# Patient Record
Sex: MALE | NOT HISPANIC OR LATINO | URBAN - NONMETROPOLITAN AREA
[De-identification: names, ages, dates, MRNs, and addresses within clinical notes are randomized per-mention and may not be internally consistent; named-entity substitution may affect disease eponyms.]

---

## 2018-05-14 ENCOUNTER — TREATMENT (OUTPATIENT)
Dept: PHYSICAL THERAPY | Facility: CLINIC | Age: 28
End: 2018-05-14

## 2018-05-14 DIAGNOSIS — M54.5 CHRONIC BILATERAL LOW BACK PAIN, WITH SCIATICA PRESENCE UNSPECIFIED: Primary | ICD-10-CM

## 2018-05-14 DIAGNOSIS — M54.2 PAIN, NECK: ICD-10-CM

## 2018-05-14 DIAGNOSIS — G89.29 CHRONIC BILATERAL LOW BACK PAIN, WITH SCIATICA PRESENCE UNSPECIFIED: Primary | ICD-10-CM

## 2018-05-14 PROCEDURE — 97110 THERAPEUTIC EXERCISES: CPT | Performed by: PHYSICAL THERAPIST

## 2018-05-14 PROCEDURE — 97161 PT EVAL LOW COMPLEX 20 MIN: CPT | Performed by: PHYSICAL THERAPIST

## 2018-05-14 NOTE — PROGRESS NOTES
Physical Therapy Initial Evaluation and Plan of Care      Patient: Aspen Daniels   : 1990  Diagnosis/ICD-10 Code:  Chronic bilateral low back pain, with sciatica presence unspecified [M54.5, G89.29]  Referring practitioner: Xochilt Soriano MD    Subjective Evaluation    History of Present Illness  Mechanism of injury: Patient states his left side of back started hurting following a fall onto his back about 6 months ago and it was feeling better for about 2 months then he started working out at the gym again and aggravated it. States pain is constant. States he can't sleep on his back due to pain.     States he has had left neck pain for about 3-4 months. States he believes it's because of how he has been locking at his phone before going to bed. States pain is not constant and only happens when he turns a certain way.        Patient Occupation: Student (fire protection) Pain  No pain reported  Pain scale: 2-3 neck soreness, 1/10 back pain.  At best pain ratin  Pain scale at highest: 6/10 neck pain, 4/10 back pain.  Location: Left neck pain from mid neck to left upper shoulder, left back pain  Quality: tight (Sore)  Relieving factors: heat  Aggravating factors: lifting, ambulation and prolonged positioning (Walking on upward slope )    Hand dominance: right    Diagnostic Tests  X-ray: normal    Patient Goals  Patient goals for therapy: increased motion, improved balance, return to sport/leisure activities, independence with ADLs/IADLs, increased strength and decreased pain  Patient goal: Return to working out (mostly machines and squats)           Objective       Postural Observations  Seated posture: good  Standing posture: good        Palpation   Left   Hypertonic in the erector spinae, cervical paraspinals, levator scapulae, lumbar paraspinals, middle trapezius, rhomboids and upper trapezius.   Tenderness of the erector spinae, cervical paraspinals, levator scapulae, lumbar paraspinals, middle  trapezius, rhomboids and upper trapezius.     Tenderness   Cervical Spine   Tenderness in the spinous process and left scapula.     Lumbar Spine  Tenderness in the spinous process.     Neurological Testing     Sensation     Lumbar   Left   Intact: light touch    Right   Intact: light touch    Active Range of Motion   Cervical/Thoracic Spine   Cervical    Left lateral flexion: 35 degrees   Right lateral flexion: 36 degrees   Left rotation: 60 degrees   Right rotation: 61 degrees     Lumbar   Flexion: WFL  Extension: WFL    Additional Active Range of Motion Details  Lumbar pain coming up from flexed position    Strength/Myotome Testing     Lumbar     Right   Normal strength    Left Hip   Planes of Motion   Flexion: 4-  Extension: 4-  Abduction: 4-  Adduction: 4-    Muscle Activation   Patient able to activate left transverse abdominals.          Assessment & Plan     Assessment  Impairments: abnormal muscle firing, abnormal muscle tone, abnormal or restricted ROM, activity intolerance, impaired physical strength and lacks appropriate home exercise program  Assessment details: Patient is a 27 year old male presenting with left sided lumbar and upper thoracic and lower cervical pain following a fall onto his back approximately 6 months ago. Signs and symptoms are consistent with muscle stain and lumbar instability. Patient presents with decreased left hip strength, decreased lumbar stabilization, decreased scapular activation, decreased cervical and thoracic mobility, and decreased ability to perform all tasks. Patient is appropriate for physical therapy at this time to address the above issues.  Prognosis: good  Prognosis details: Short term goals (3 weeks):  1. Patient will be independent with home exercise program.  2. Patient will demonstrate improved hip strength by 50%  3. Patient will demonstrate improved scapular activation by 50%    Long term goals (6 weeks):  1. Patient will be able to walk at least 30 minutes  without increase in back pain.  2. Patient will demonstrate full squat with no increase in back pain.  3. Patient will be able to work overhead for at least 15 minutes without increase in cervical or thoracic pain.   Functional Limitations: carrying objects, lifting, walking, pulling, pushing and uncomfortable because of pain  Plan  Therapy options: will be seen for skilled physical therapy services  Planned modality interventions: cryotherapy, high voltage pulsed current (pain management), high voltage pulsed current (spasm management), TENS, thermotherapy (hydrocollator packs), traction and ultrasound  Planned therapy interventions: abdominal trunk stabilization, ADL retraining, balance/weight-bearing training, body mechanics training, fine motor coordination training, flexibility, functional ROM exercises, home exercise program, gait training, IADL retraining, joint mobilization, manual therapy, motor coordination training, neuromuscular re-education, postural training, soft tissue mobilization, spinal/joint mobilization, strengthening, stretching and therapeutic activities  Frequency: 1-3x/week.  Duration in weeks: 6  Treatment plan discussed with: patient        Manual Therapy:         mins  81210;  Therapeutic Exercise:    24     mins  70060;     Neuromuscular Elyssa:        mins  30296;    Therapeutic Activity:          mins  76603;     Gait Training:           mins  27158;     Ultrasound:          mins  31625;    Electrical Stimulation:        mins  93113 ( );  Dry Needling          mins self-pay    Timed Treatment:   24   mins   Total Treatment:     58   mins    PT SIGNATURE: Rosetta Pereira, FRANDY   DATE TREATMENT INITIATED: 5/15/2018    Initial Certification  Certification Period: 8/13/2018  I certify that the therapy services are furnished while this patient is under my care.  The services outlined above are required by this patient, and will be reviewed every 90 days.     PHYSICIAN: Xochilt Soriano,  MD      DATE:     Please sign and return via fax to [phone].. Thank you, [facility].

## 2018-05-18 ENCOUNTER — TREATMENT (OUTPATIENT)
Dept: PHYSICAL THERAPY | Facility: CLINIC | Age: 28
End: 2018-05-18

## 2018-05-18 PROCEDURE — 97112 NEUROMUSCULAR REEDUCATION: CPT | Performed by: PHYSICAL THERAPIST

## 2018-05-18 PROCEDURE — 97110 THERAPEUTIC EXERCISES: CPT | Performed by: PHYSICAL THERAPIST

## 2018-05-18 NOTE — PROGRESS NOTES
Subjective   Aspen Jeremiah reports: 4/10 lumbar pain, 0/10 pain in upper thoracic.     Objective   Motor control: difficulty maintaining TA hold with feet marching as well as with birddog exercise.       See Exercise, Manual, and Modality Logs for complete treatment.       Assessment/Plan  Pt progressing with exercises without increase in upper or lower back pain. Will continue to work on lumbar and thoracic stabilization.   Progress per Plan of Care           Manual Therapy:    10     mins  09709;  Therapeutic Exercise:    23     mins  44159;     Neuromuscular Elyssa:   12     mins  43307;    Therapeutic Activity:          mins  72730;     Gait Training:           mins  59724;     Ultrasound:          mins  75413;   Iontophoresis          mins  09796   Electrical Stimulation:         mins  47360 ( );  Dry Needling          mins self-pay  Fluidotherapy          mins 71606    Timed Treatment:   45   mins   Total Treatment:     47   mins    Rosetta Pereira, PT  Physical Therapist

## 2018-05-25 ENCOUNTER — TREATMENT (OUTPATIENT)
Dept: PHYSICAL THERAPY | Facility: CLINIC | Age: 28
End: 2018-05-25

## 2018-05-25 DIAGNOSIS — M54.5 CHRONIC BILATERAL LOW BACK PAIN, WITH SCIATICA PRESENCE UNSPECIFIED: Primary | ICD-10-CM

## 2018-05-25 DIAGNOSIS — M54.2 PAIN, NECK: ICD-10-CM

## 2018-05-25 DIAGNOSIS — G89.29 CHRONIC BILATERAL LOW BACK PAIN, WITH SCIATICA PRESENCE UNSPECIFIED: Primary | ICD-10-CM

## 2018-05-25 PROCEDURE — 97140 MANUAL THERAPY 1/> REGIONS: CPT | Performed by: PHYSICAL THERAPIST

## 2018-05-25 PROCEDURE — 97112 NEUROMUSCULAR REEDUCATION: CPT | Performed by: PHYSICAL THERAPIST

## 2018-05-25 PROCEDURE — 97110 THERAPEUTIC EXERCISES: CPT | Performed by: PHYSICAL THERAPIST

## 2018-05-25 NOTE — PROGRESS NOTES
Subjective   Aspen Daniels reports: 5/10 low back and 3-4 right post shoulder/upper back pain. States upper back is hurting some with certain exercises in the gym. States overall his pain is getting better compared to the first visit.  States he has been having some right patella and hamstring pain when he sits cross-legged (which he often does at home to eat - cultural)     Objective   Palpation: mild tenderness on L5-S1 segment and right PSIS    Negative for medial or lateral pain or laxity with provocation tests. Hip IR and ER full and painfree. Possible patellar tracking issue.     See Exercise, Manual, and Modality Logs for complete treatment.       Assessment/Plan  Pt reports improvement in pain (despite higher nominal rating).   Progress per Plan of Care           Manual Therapy:    15     mins  39178;  Therapeutic Exercise:    31     mins  53222;     Neuromuscular Elyssa:    8    mins  45969;    Therapeutic Activity:          mins  57197;     Gait Training:           mins  71790;     Ultrasound:          mins  26653;   Iontophoresis          mins  12685   Electrical Stimulation:         mins  44427 ( );  Dry Needling          mins self-pay  Fluidotherapy          mins 32588    Timed Treatment:   54   mins   Total Treatment:     58   mins    Rosetta Pereira, PT  Physical Therapist

## 2018-06-06 ENCOUNTER — TREATMENT (OUTPATIENT)
Dept: PHYSICAL THERAPY | Facility: CLINIC | Age: 28
End: 2018-06-06

## 2018-06-06 DIAGNOSIS — G89.29 CHRONIC BILATERAL LOW BACK PAIN, WITH SCIATICA PRESENCE UNSPECIFIED: Primary | ICD-10-CM

## 2018-06-06 DIAGNOSIS — M54.2 PAIN, NECK: ICD-10-CM

## 2018-06-06 DIAGNOSIS — M54.5 CHRONIC BILATERAL LOW BACK PAIN, WITH SCIATICA PRESENCE UNSPECIFIED: Primary | ICD-10-CM

## 2018-06-06 PROCEDURE — 97140 MANUAL THERAPY 1/> REGIONS: CPT | Performed by: PHYSICAL THERAPIST

## 2018-06-06 PROCEDURE — 97110 THERAPEUTIC EXERCISES: CPT | Performed by: PHYSICAL THERAPIST

## 2018-06-06 PROCEDURE — 97112 NEUROMUSCULAR REEDUCATION: CPT | Performed by: PHYSICAL THERAPIST

## 2018-06-06 NOTE — PROGRESS NOTES
Subjective   Aspen Dangeloshimi reports: No back or neck pain at rest. Mild lower lumbar pain with sleeping on stomach, upper back/ neck is better.     Objective   Palpation: no tenderness in lumbar segments   See Exercise, Manual, and Modality Logs for complete treatment.       Assessment/Plan  Pt improving with symptoms. Some pain with SLR which was reduced with addition of ppt suggesting lingering decreased stability.   Progress per Plan of Care           Manual Therapy:    15     mins  49762;  Therapeutic Exercise:    32     mins  22114;     Neuromuscular Elyssa:    8    mins  96082;    Therapeutic Activity:          mins  61286;     Gait Training:           mins  76387;     Ultrasound:          mins  23517;   Iontophoresis          mins  55971   Electrical Stimulation:         mins  30130 (MC );  Dry Needling          mins self-pay  Fluidotherapy          mins 99788    Timed Treatment:   54   mins   Total Treatment:     58   mins    Rosetta Pereira, PT  Physical Therapist

## 2018-06-13 ENCOUNTER — TREATMENT (OUTPATIENT)
Dept: PHYSICAL THERAPY | Facility: CLINIC | Age: 28
End: 2018-06-13

## 2018-06-13 DIAGNOSIS — M54.5 CHRONIC BILATERAL LOW BACK PAIN, WITH SCIATICA PRESENCE UNSPECIFIED: Primary | ICD-10-CM

## 2018-06-13 DIAGNOSIS — M54.2 PAIN, NECK: ICD-10-CM

## 2018-06-13 DIAGNOSIS — G89.29 CHRONIC BILATERAL LOW BACK PAIN, WITH SCIATICA PRESENCE UNSPECIFIED: Primary | ICD-10-CM

## 2018-06-13 PROCEDURE — 97110 THERAPEUTIC EXERCISES: CPT | Performed by: PHYSICAL THERAPIST

## 2018-06-13 PROCEDURE — 97140 MANUAL THERAPY 1/> REGIONS: CPT | Performed by: PHYSICAL THERAPIST

## 2018-06-13 NOTE — PROGRESS NOTES
Subjective   Aspen Daniels reports: 6/10 pain at rest. States it has been better than before but still having pain in lower back and right shoulder with exercise.     Objective   Palpation: Tenderness in right lev scap, mild tenderness along left iliac crest    See Exercise, Manual, and Modality Logs for complete treatment.       Assessment/Plan   Neck/shoulder relief following exercise and with thoracic manipulation. Pt requested manipulation, which is appropr stating where he is from that would solve his issue instantly, discussed getting to root of problem rather than temporary symptom relief. Patient left abruptly following manipulation.   Progress per Plan of Care           Manual Therapy:    15     mins  44883;  Therapeutic Exercise:    32     mins  16380;     Neuromuscular Elyssa:        mins  33697;    Therapeutic Activity:          mins  34759;     Gait Training:           mins  23047;     Ultrasound:          mins  90580;   Iontophoresis         mins  71807   Electrical Stimulation:         mins  07658 ( );  Dry Needling          mins self-pay  Fluidotherapy          mins 45097    Timed Treatment:   47   mins   Total Treatment:     51   mins    Rosetta Pereira, PT  Physical Therapist

## 2018-06-20 ENCOUNTER — TREATMENT (OUTPATIENT)
Dept: PHYSICAL THERAPY | Facility: CLINIC | Age: 28
End: 2018-06-20

## 2018-06-20 DIAGNOSIS — M54.2 PAIN, NECK: ICD-10-CM

## 2018-06-20 DIAGNOSIS — G89.29 CHRONIC BILATERAL LOW BACK PAIN, WITH SCIATICA PRESENCE UNSPECIFIED: Primary | ICD-10-CM

## 2018-06-20 DIAGNOSIS — M54.5 CHRONIC BILATERAL LOW BACK PAIN, WITH SCIATICA PRESENCE UNSPECIFIED: Primary | ICD-10-CM

## 2018-06-20 PROCEDURE — 97110 THERAPEUTIC EXERCISES: CPT | Performed by: PHYSICAL THERAPIST

## 2018-06-20 PROCEDURE — 97140 MANUAL THERAPY 1/> REGIONS: CPT | Performed by: PHYSICAL THERAPIST

## 2018-06-20 NOTE — PROGRESS NOTES
Subjective   Aspen Daniels reports: 5/10 bilateral lower back pain. States he has had lower back pain for the past few days and he has not been doing exercises because he has been busy.     Objective   Palpation: no muscular tenderness to palpation    Joint play: no tenderness to lower lumbar PA, some tenderness with CT junction PA; both improved in pain symptoms following manual therapy.   See Exercise, Manual, and Modality Logs for complete treatment.       Assessment/Plan  Back pain reduced to 3/10 following lumbar PA. Pt educated on foam rolling lower and upper back before and after working out or when he is having lumbar pain.   Progress per Plan of Care           Manual Therapy:    23     mins  97386;  Therapeutic Exercise:    33     mins  69167;     Neuromuscular Elyssa:        mins  84054;    Therapeutic Activity:          mins  17565;     Gait Training:           mins  39002;     Ultrasound:          mins  56172;   Iontophoresis          mins  51709   Electrical Stimulation:        mins  73074 ( );  Dry Needling          mins self-pay  Fluidotherapy          mins 21476    Timed Treatment:   56   mins   Total Treatment:     58   mins    Rosetta Pereira, PT  Physical Therapist

## 2018-08-21 ENCOUNTER — TELEPHONE (OUTPATIENT)
Dept: URGENT CARE | Facility: CLINIC | Age: 28
End: 2018-08-21

## 2018-09-19 ENCOUNTER — OFFICE VISIT (OUTPATIENT)
Dept: INTERNAL MEDICINE | Facility: CLINIC | Age: 28
End: 2018-09-19

## 2018-09-19 VITALS
WEIGHT: 164 LBS | OXYGEN SATURATION: 100 % | SYSTOLIC BLOOD PRESSURE: 142 MMHG | HEIGHT: 67 IN | BODY MASS INDEX: 25.74 KG/M2 | DIASTOLIC BLOOD PRESSURE: 86 MMHG | TEMPERATURE: 98.1 F | HEART RATE: 92 BPM

## 2018-09-19 DIAGNOSIS — G89.29 CHRONIC BILATERAL LOW BACK PAIN WITHOUT SCIATICA: ICD-10-CM

## 2018-09-19 DIAGNOSIS — R53.83 FATIGUE, UNSPECIFIED TYPE: ICD-10-CM

## 2018-09-19 DIAGNOSIS — Z72.0 TOBACCO ABUSE: ICD-10-CM

## 2018-09-19 DIAGNOSIS — M54.50 CHRONIC BILATERAL LOW BACK PAIN WITHOUT SCIATICA: ICD-10-CM

## 2018-09-19 DIAGNOSIS — Z00.00 ENCOUNTER FOR PREVENTIVE HEALTH EXAMINATION: Primary | ICD-10-CM

## 2018-09-19 PROCEDURE — 99395 PREV VISIT EST AGE 18-39: CPT | Performed by: INTERNAL MEDICINE

## 2018-09-19 RX ORDER — CYCLOBENZAPRINE HCL 5 MG
5 TABLET ORAL 2 TIMES DAILY PRN
Qty: 40 TABLET | Refills: 2 | Status: SHIPPED | OUTPATIENT
Start: 2018-09-19 | End: 2018-10-30

## 2018-09-19 NOTE — PROGRESS NOTES
Chief Complaint   Patient presents with   • Establish Care   • Back Pain     s/p fall while playing soccer several months ago       Subjective     History of Present Illness   Aspen Daniels is a 28 y.o. male presenting for annual physical.  Preventive health maintenance was reviewed and discussed today.  Patient shares that he has been relatively healthy other than over the last 6 months since he has been dealing with chronic back pain following injury while playing soccer.  Patient states that he slipped backwards and landed on his low back while he was going for her care.  Since that time, he has been having difficulty sitting in a chair for long periods of time, has pain with walking long distances such as across campus, and has to sleep on his side in order to get relief from pain at night.  Symptoms have been ongoing over the last several months and have not been improving with physical therapy.    The following portions of the patient's history were reviewed and updated as appropriate: allergies, current medications, past family history, past medical history, past social history, past surgical history and problem list.    Review of Systems   Constitutional: Negative for chills, fatigue and fever.   HENT: Negative for congestion, ear pain, rhinorrhea, sinus pressure and sore throat.    Eyes: Negative for visual disturbance.   Respiratory: Negative for cough, chest tightness, shortness of breath and wheezing.    Cardiovascular: Negative for chest pain, palpitations and leg swelling.   Gastrointestinal: Negative for abdominal pain, blood in stool, constipation, diarrhea, nausea and vomiting.   Endocrine: Negative for polydipsia and polyuria.   Genitourinary: Negative for dysuria and hematuria.   Musculoskeletal: Positive for arthralgias and back pain.   Skin: Negative for rash.   Neurological: Negative for dizziness, light-headedness, numbness and headaches.   Psychiatric/Behavioral: Negative for dysphoric mood and  "sleep disturbance. The patient is not nervous/anxious.        No Known Allergies    No past medical history on file.    Social History     Social History   • Marital status: Single     Spouse name: N/A   • Number of children: N/A   • Years of education: N/A     Occupational History   • Not on file.     Social History Main Topics   • Smoking status: Current Some Day Smoker   • Smokeless tobacco: Current User   • Alcohol use Yes      Comment: socially   • Drug use: No   • Sexual activity: Not on file     Other Topics Concern   • Not on file     Social History Narrative   • No narrative on file        No past surgical history on file.    Family History   Problem Relation Age of Onset   • No Known Problems Mother    • No Known Problems Father          Current Outpatient Prescriptions:   •  promethazine-dextromethorphan (PROMETHAZINE-DM) 6.25-15 MG/5ML syrup, Take 5 mL by mouth 4 (Four) Times a Day As Needed for Cough., Disp: 240 mL, Rfl: 0  •  cyclobenzaprine (FLEXERIL) 5 MG tablet, Take 1 tablet by mouth 2 (Two) Times a Day As Needed for Muscle Spasms., Disp: 40 tablet, Rfl: 2    Objective   /86 (BP Location: Left arm)   Pulse 92   Temp 98.1 °F (36.7 °C)   Ht 170.2 cm (67\")   Wt 74.4 kg (164 lb)   SpO2 100%   BMI 25.69 kg/m²     Physical Exam   Constitutional: He is oriented to person, place, and time. He appears well-nourished. No distress.   HENT:   Head: Atraumatic.   Right Ear: External ear normal.   Left Ear: External ear normal.   Eyes: Conjunctivae are normal. Right eye exhibits no discharge. Left eye exhibits no discharge.   Cardiovascular: Normal rate and regular rhythm.    No murmur heard.  Pulmonary/Chest: Effort normal and breath sounds normal. He has no wheezes. He has no rales.   Abdominal: Soft. Bowel sounds are normal. He exhibits no distension. There is no tenderness.   Musculoskeletal:   Low back tenderness along SI joint   Neurological: He is alert and oriented to person, place, and time. "   Skin: No rash noted. He is not diaphoretic.   Psychiatric: He has a normal mood and affect.   Nursing note and vitals reviewed.      Assessment/Plan   Aspen was seen today for establish care and back pain.    Diagnoses and all orders for this visit:    Encounter for preventive health examination  -     CBC & Differential  -     Comprehensive Metabolic Panel  -     TSH  -     Vitamin B12  -     Vitamin D 25 Hydroxy  -     HIV-1 / O / 2 Ag / Antibody 4th Generation    Chronic bilateral low back pain without sciatica  -     MRI Lumbar Spine Without Contrast; Future  -     cyclobenzaprine (FLEXERIL) 5 MG tablet; Take 1 tablet by mouth 2 (Two) Times a Day As Needed for Muscle Spasms.    Tobacco abuse  -     CBC & Differential  -     Comprehensive Metabolic Panel  -     TSH  -     Vitamin B12  -     Vitamin D 25 Hydroxy  -     HIV-1 / O / 2 Ag / Antibody 4th Generation    Fatigue, unspecified type  -     CBC & Differential  -     Comprehensive Metabolic Panel  -     TSH  -     Vitamin B12  -     Vitamin D 25 Hydroxy  -     HIV-1 / O / 2 Ag / Antibody 4th Generation          Discussion Summary:    1. Preventive Health Maintenance  - Baseline labs ordered per above.  - Vaccines reviewed and updated  - Preventive health measures were discussed including: healthy diet with increase in fruits and vegetables, regular exercise at least 3 times a week, safe sex practices, avoidance of drugs, tobacco, and alcohol, and regular seatbelt use.    2.  Chronic low back pain  -Patient has attempted physical therapy without benefit over the last 6 months.  Given the persistent symptoms and inability to ambulate long distances.  Patient would benefit from further evaluation with MRI.  Order has been placed.  -Compounding pain cream prescribed in addition to Flexeril to be used at nighttime.    3.  Tobacco abuse  -Patient encouraged to quit however he is not ready to reduce his cigarette use at this time.      Follow up:  Return in about 6  weeks (around 10/31/2018) for Next scheduled follow up.     Patient Instructions:  Patient instructions were provided.

## 2018-09-19 NOTE — PATIENT INSTRUCTIONS
Health Maintenance, Male  A healthy lifestyle and preventive care is important for your health and wellness. Ask your health care provider about what schedule of regular examinations is right for you.  What should I know about weight and diet?  Eat a Healthy Diet  · Eat plenty of vegetables, fruits, whole grains, low-fat dairy products, and lean protein.  · Do not eat a lot of foods high in solid fats, added sugars, or salt.    Maintain a Healthy Weight  Regular exercise can help you achieve or maintain a healthy weight. You should:  · Do at least 150 minutes of exercise each week. The exercise should increase your heart rate and make you sweat (moderate-intensity exercise).  · Do strength-training exercises at least twice a week.    Watch Your Levels of Cholesterol and Blood Lipids  · Have your blood tested for lipids and cholesterol every 5 years starting at 35 years of age. If you are at high risk for heart disease, you should start having your blood tested when you are 20 years old. You may need to have your cholesterol levels checked more often if:  ? Your lipid or cholesterol levels are high.  ? You are older than 50 years of age.  ? You are at high risk for heart disease.    What should I know about cancer screening?  Many types of cancers can be detected early and may often be prevented.  Lung Cancer  · You should be screened every year for lung cancer if:  ? You are a current smoker who has smoked for at least 30 years.  ? You are a former smoker who has quit within the past 15 years.  · Talk to your health care provider about your screening options, when you should start screening, and how often you should be screened.    Colorectal Cancer  · Routine colorectal cancer screening usually begins at 50 years of age and should be repeated every 5-10 years until you are 75 years old. You may need to be screened more often if early forms of precancerous polyps or small growths are found. Your health care provider  may recommend screening at an earlier age if you have risk factors for colon cancer.  · Your health care provider may recommend using home test kits to check for hidden blood in the stool.  · A small camera at the end of a tube can be used to examine your colon (sigmoidoscopy or colonoscopy). This checks for the earliest forms of colorectal cancer.    Prostate and Testicular Cancer  · Depending on your age and overall health, your health care provider may do certain tests to screen for prostate and testicular cancer.  · Talk to your health care provider about any symptoms or concerns you have about testicular or prostate cancer.    Skin Cancer  · Check your skin from head to toe regularly.  · Tell your health care provider about any new moles or changes in moles, especially if:  ? There is a change in a mole’s size, shape, or color.  ? You have a mole that is larger than a pencil eraser.  · Always use sunscreen. Apply sunscreen liberally and repeat throughout the day.  · Protect yourself by wearing long sleeves, pants, a wide-brimmed hat, and sunglasses when outside.    What should I know about heart disease, diabetes, and high blood pressure?  · If you are 18-39 years of age, have your blood pressure checked every 3-5 years. If you are 40 years of age or older, have your blood pressure checked every year. You should have your blood pressure measured twice--once when you are at a hospital or clinic, and once when you are not at a hospital or clinic. Record the average of the two measurements. To check your blood pressure when you are not at a hospital or clinic, you can use:  ? An automated blood pressure machine at a pharmacy.  ? A home blood pressure monitor.  · Talk to your health care provider about your target blood pressure.  · If you are between 45-79 years old, ask your health care provider if you should take aspirin to prevent heart disease.  · Have regular diabetes screenings by checking your fasting blood  sugar level.  ? If you are at a normal weight and have a low risk for diabetes, have this test once every three years after the age of 45.  ? If you are overweight and have a high risk for diabetes, consider being tested at a younger age or more often.  · A one-time screening for abdominal aortic aneurysm (AAA) by ultrasound is recommended for men aged 65-75 years who are current or former smokers.  What should I know about preventing infection?  Hepatitis B  If you have a higher risk for hepatitis B, you should be screened for this virus. Talk with your health care provider to find out if you are at risk for hepatitis B infection.  Hepatitis C  Blood testing is recommended for:  · Everyone born from 1945 through 1965.  · Anyone with known risk factors for hepatitis C.    Sexually Transmitted Diseases (STDs)  · You should be screened each year for STDs including gonorrhea and chlamydia if:  ? You are sexually active and are younger than 24 years of age.  ? You are older than 24 years of age and your health care provider tells you that you are at risk for this type of infection.  ? Your sexual activity has changed since you were last screened and you are at an increased risk for chlamydia or gonorrhea. Ask your health care provider if you are at risk.  · Talk with your health care provider about whether you are at high risk of being infected with HIV. Your health care provider may recommend a prescription medicine to help prevent HIV infection.    What else can I do?  · Schedule regular health, dental, and eye exams.  · Stay current with your vaccines (immunizations).  · Do not use any tobacco products, such as cigarettes, chewing tobacco, and e-cigarettes. If you need help quitting, ask your health care provider.  · Limit alcohol intake to no more than 2 drinks per day. One drink equals 12 ounces of beer, 5 ounces of wine, or 1½ ounces of hard liquor.  · Do not use street drugs.  · Do not share needles.  · Ask your  health care provider for help if you need support or information about quitting drugs.  · Tell your health care provider if you often feel depressed.  · Tell your health care provider if you have ever been abused or do not feel safe at home.  This information is not intended to replace advice given to you by your health care provider. Make sure you discuss any questions you have with your health care provider.  Document Released: 06/15/2009 Document Revised: 08/16/2017 Document Reviewed: 09/20/2016  [employer] Interactive Patient Education © 2018 [employer]

## 2018-09-21 LAB
25(OH)D3+25(OH)D2 SERPL-MCNC: 23 NG/ML
ALBUMIN SERPL-MCNC: 4.4 G/DL (ref 3.5–5)
ALBUMIN/GLOB SERPL: 1.6 G/DL (ref 1–2)
ALP SERPL-CCNC: 77 U/L (ref 38–126)
ALT SERPL-CCNC: 38 U/L (ref 13–69)
AST SERPL-CCNC: 28 U/L (ref 15–46)
BASOPHILS # BLD AUTO: 0.05 10*3/MM3 (ref 0–0.2)
BASOPHILS NFR BLD AUTO: 0.8 % (ref 0–2.5)
BILIRUB SERPL-MCNC: 1 MG/DL (ref 0.2–1.3)
BUN SERPL-MCNC: 12 MG/DL (ref 7–20)
BUN/CREAT SERPL: 13.3 (ref 6.3–21.9)
CALCIUM SERPL-MCNC: 9.5 MG/DL (ref 8.4–10.2)
CHLORIDE SERPL-SCNC: 103 MMOL/L (ref 98–107)
CO2 SERPL-SCNC: 26 MMOL/L (ref 26–30)
CREAT SERPL-MCNC: 0.9 MG/DL (ref 0.6–1.3)
EOSINOPHIL # BLD AUTO: 0.15 10*3/MM3 (ref 0–0.7)
EOSINOPHIL NFR BLD AUTO: 2.5 % (ref 0–7)
ERYTHROCYTE [DISTWIDTH] IN BLOOD BY AUTOMATED COUNT: 12.8 % (ref 11.5–14.5)
GLOBULIN SER CALC-MCNC: 2.8 GM/DL
GLUCOSE SERPL-MCNC: 96 MG/DL (ref 74–98)
HCT VFR BLD AUTO: 49.6 % (ref 42–52)
HGB BLD-MCNC: 16.9 G/DL (ref 14–18)
HIV 1+2 AB+HIV1 P24 AG SERPL QL IA: NON REACTIVE
IMM GRANULOCYTES # BLD: 0.06 10*3/MM3 (ref 0–0.06)
IMM GRANULOCYTES NFR BLD: 1 % (ref 0–0.6)
LYMPHOCYTES # BLD AUTO: 2.94 10*3/MM3 (ref 0.6–3.4)
LYMPHOCYTES NFR BLD AUTO: 49.9 % (ref 10–50)
MCH RBC QN AUTO: 30.3 PG (ref 27–31)
MCHC RBC AUTO-ENTMCNC: 34.1 G/DL (ref 30–37)
MCV RBC AUTO: 89 FL (ref 80–94)
MONOCYTES # BLD AUTO: 0.53 10*3/MM3 (ref 0–0.9)
MONOCYTES NFR BLD AUTO: 9 % (ref 0–12)
NEUTROPHILS # BLD AUTO: 2.16 10*3/MM3 (ref 2–6.9)
NEUTROPHILS NFR BLD AUTO: 36.8 % (ref 37–80)
NRBC BLD AUTO-RTO: 0 /100 WBC (ref 0–0)
PLATELET # BLD AUTO: 219 10*3/MM3 (ref 130–400)
POTASSIUM SERPL-SCNC: 4.5 MMOL/L (ref 3.5–5.1)
PROT SERPL-MCNC: 7.2 G/DL (ref 6.3–8.2)
RBC # BLD AUTO: 5.57 10*6/MM3 (ref 4.7–6.1)
SODIUM SERPL-SCNC: 137 MMOL/L (ref 137–145)
TSH SERPL DL<=0.005 MIU/L-ACNC: 1.24 MIU/ML (ref 0.47–4.68)
VIT B12 SERPL-MCNC: 672 PG/ML (ref 239–931)
WBC # BLD AUTO: 5.89 10*3/MM3 (ref 4.8–10.8)

## 2018-09-21 NOTE — PROGRESS NOTES
But the patient know that his labs were all in normal range.  His vitamin D is borderline low.  I will recommended taking 2000 units daily of vitamin D available over-the-counter.

## 2018-09-26 ENCOUNTER — HOSPITAL ENCOUNTER (OUTPATIENT)
Dept: MRI IMAGING | Facility: HOSPITAL | Age: 28
Discharge: HOME | End: 2018-09-26
Attending: INTERNAL MEDICINE | Admitting: INTERNAL MEDICINE

## 2018-09-26 DIAGNOSIS — M54.50 CHRONIC BILATERAL LOW BACK PAIN WITHOUT SCIATICA: ICD-10-CM

## 2018-09-26 DIAGNOSIS — G89.29 CHRONIC BILATERAL LOW BACK PAIN WITHOUT SCIATICA: ICD-10-CM

## 2018-09-26 PROCEDURE — 72148 MRI LUMBAR SPINE W/O DYE: CPT

## 2018-09-27 NOTE — PROGRESS NOTES
MRI spine is normal.  He has no evidence of disc disease or fractures.  Soft tissues are also appearing normal.

## 2018-12-04 ENCOUNTER — OFFICE VISIT (OUTPATIENT)
Dept: INTERNAL MEDICINE | Facility: CLINIC | Age: 28
End: 2018-12-04

## 2018-12-04 VITALS
OXYGEN SATURATION: 100 % | HEART RATE: 76 BPM | SYSTOLIC BLOOD PRESSURE: 142 MMHG | BODY MASS INDEX: 26.53 KG/M2 | HEIGHT: 67 IN | TEMPERATURE: 98.2 F | WEIGHT: 169 LBS | DIASTOLIC BLOOD PRESSURE: 84 MMHG

## 2018-12-04 DIAGNOSIS — M54.50 CHRONIC MIDLINE LOW BACK PAIN WITHOUT SCIATICA: Primary | ICD-10-CM

## 2018-12-04 DIAGNOSIS — R79.89 LOW VITAMIN D LEVEL: ICD-10-CM

## 2018-12-04 DIAGNOSIS — G89.29 CHRONIC MIDLINE LOW BACK PAIN WITHOUT SCIATICA: Primary | ICD-10-CM

## 2018-12-04 DIAGNOSIS — M54.89 INFLAMMATORY BACK PAIN: ICD-10-CM

## 2018-12-04 PROCEDURE — 99214 OFFICE O/P EST MOD 30 MIN: CPT | Performed by: INTERNAL MEDICINE

## 2018-12-04 RX ORDER — CHOLECALCIFEROL (VITAMIN D3) 50 MCG
2000 TABLET ORAL DAILY
COMMUNITY

## 2018-12-04 NOTE — PROGRESS NOTES
Chief Complaint   Patient presents with   • Back Pain     x 1 year       Subjective     History of Present Illness   Aspen Daniels is a 28 y.o. male presenting with persistent back pain over the last 1 year.  MRI results were reviewed and discussed.  Patient had no evidence of disc disease or nerve impingement. He has had limited benefit from physical therapy and compounding pain creams.  He is interested in specialist work up. Pain remains in the lower lumbar spine midline without radiating symptoms.     The following portions of the patient's history were reviewed and updated as appropriate: allergies, current medications, past family history, past medical history, past social history, past surgical history and problem list.    Review of Systems   Constitutional: Negative for chills, fatigue and fever.   HENT: Negative for congestion, ear pain, rhinorrhea, sinus pressure and sore throat.    Eyes: Negative for visual disturbance.   Respiratory: Negative for cough, chest tightness, shortness of breath and wheezing.    Cardiovascular: Negative for chest pain, palpitations and leg swelling.   Gastrointestinal: Negative for abdominal pain, blood in stool, constipation, diarrhea, nausea and vomiting.   Endocrine: Negative for polydipsia and polyuria.   Genitourinary: Negative for dysuria and hematuria.   Musculoskeletal: Positive for back pain.   Skin: Negative for rash.   Neurological: Negative for dizziness, light-headedness, numbness and headaches.   Psychiatric/Behavioral: Negative for dysphoric mood and sleep disturbance. The patient is not nervous/anxious.        No Known Allergies    No past medical history on file.    Social History     Socioeconomic History   • Marital status: Single     Spouse name: Not on file   • Number of children: Not on file   • Years of education: Not on file   • Highest education level: Not on file   Social Needs   • Financial resource strain: Not on file   • Food insecurity - worry: Not  "on file   • Food insecurity - inability: Not on file   • Transportation needs - medical: Not on file   • Transportation needs - non-medical: Not on file   Occupational History   • Not on file   Tobacco Use   • Smoking status: Current Some Day Smoker   • Smokeless tobacco: Current User   Substance and Sexual Activity   • Alcohol use: Yes     Comment: socially   • Drug use: No   • Sexual activity: Not on file   Other Topics Concern   • Not on file   Social History Narrative   • Not on file        No past surgical history on file.    Family History   Problem Relation Age of Onset   • No Known Problems Mother    • No Known Problems Father          Current Outpatient Medications:   •  Cholecalciferol (VITAMIN D) 2000 units tablet, Take 2,000 Units by mouth Daily., Disp: , Rfl:     Objective   /84 (BP Location: Left arm, Patient Position: Sitting)   Pulse 76   Temp 98.2 °F (36.8 °C)   Ht 170.2 cm (67\")   Wt 76.7 kg (169 lb)   SpO2 100%   BMI 26.47 kg/m²     Physical Exam   Constitutional: He is oriented to person, place, and time. He appears well-developed and well-nourished.   HENT:   Head: Normocephalic and atraumatic.   Eyes: Conjunctivae are normal.   Pulmonary/Chest: Effort normal.   Musculoskeletal: Normal range of motion.   Neurological: He is alert and oriented to person, place, and time.   Psychiatric: He has a normal mood and affect. His behavior is normal.   Nursing note and vitals reviewed.      Assessment/Plan   Aspen was seen today for back pain.    Diagnoses and all orders for this visit:    Chronic midline low back pain without sciatica  -     Sedimentation Rate  -     HLA-B27 Antigen  -     C-reactive protein  -     RENU  -     Ambulatory Referral to Orthopedic Surgery    Low vitamin D level  -     Vitamin D 25 Hydroxy    Inflammatory back pain  -     Sedimentation Rate  -     HLA-B27 Antigen  -     C-reactive protein  -     RENU          Discussion Summary:    29 yo [place] Male presenting for " follow up.     1. Chronic back pain  - consider inflammatory conditions, labs ordered  - may benefit from orthopedic evaluation.    2. Low vitamin D - check labs.     Follow up:  No Follow-up on file.     Patient Instructions:  Patient instructions were provided.

## 2018-12-06 ENCOUNTER — TELEPHONE (OUTPATIENT)
Dept: INTERNAL MEDICINE | Facility: CLINIC | Age: 28
End: 2018-12-06

## 2018-12-11 NOTE — PROGRESS NOTES
Labs are in normal range.  Vitamin D is still a little low.  I would advise on continuing OTC vitamin d supplements,  3000 to 5000U daily.

## 2018-12-12 LAB
25(OH)D3+25(OH)D2 SERPL-MCNC: 27.4 NG/ML (ref 30–100)
ANA SER QL: NEGATIVE
CRP SERPL-MCNC: 1.2 MG/L (ref 0–4.9)
HLA-B27 QL NAA+PROBE: NEGATIVE